# Patient Record
Sex: MALE | Race: WHITE | NOT HISPANIC OR LATINO | URBAN - METROPOLITAN AREA
[De-identification: names, ages, dates, MRNs, and addresses within clinical notes are randomized per-mention and may not be internally consistent; named-entity substitution may affect disease eponyms.]

---

## 2020-12-03 ENCOUNTER — INPATIENT (INPATIENT)
Facility: HOSPITAL | Age: 85
LOS: 2 days | Discharge: HOME HEALTH | End: 2020-12-06
Attending: INTERNAL MEDICINE | Admitting: INTERNAL MEDICINE
Payer: MEDICARE

## 2020-12-03 VITALS
DIASTOLIC BLOOD PRESSURE: 62 MMHG | TEMPERATURE: 99 F | RESPIRATION RATE: 17 BRPM | HEART RATE: 118 BPM | OXYGEN SATURATION: 99 % | SYSTOLIC BLOOD PRESSURE: 101 MMHG

## 2020-12-03 LAB
ALBUMIN SERPL ELPH-MCNC: 3.5 G/DL — SIGNIFICANT CHANGE UP (ref 3.3–5)
ALP SERPL-CCNC: 54 U/L — SIGNIFICANT CHANGE UP (ref 40–120)
ALT FLD-CCNC: 11 U/L — LOW (ref 12–78)
ANION GAP SERPL CALC-SCNC: 7 MMOL/L — SIGNIFICANT CHANGE UP (ref 5–17)
APPEARANCE UR: CLEAR — SIGNIFICANT CHANGE UP
APTT BLD: 28.7 SEC — SIGNIFICANT CHANGE UP (ref 27.5–35.5)
AST SERPL-CCNC: 18 U/L — SIGNIFICANT CHANGE UP (ref 15–37)
BACTERIA # UR AUTO: ABNORMAL
BASOPHILS # BLD AUTO: 0.03 K/UL — SIGNIFICANT CHANGE UP (ref 0–0.2)
BASOPHILS NFR BLD AUTO: 0.4 % — SIGNIFICANT CHANGE UP (ref 0–2)
BILIRUB SERPL-MCNC: 0.9 MG/DL — SIGNIFICANT CHANGE UP (ref 0.2–1.2)
BILIRUB UR-MCNC: NEGATIVE — SIGNIFICANT CHANGE UP
BUN SERPL-MCNC: 17 MG/DL — SIGNIFICANT CHANGE UP (ref 7–23)
CALCIUM SERPL-MCNC: 8.6 MG/DL — SIGNIFICANT CHANGE UP (ref 8.5–10.1)
CHLORIDE SERPL-SCNC: 104 MMOL/L — SIGNIFICANT CHANGE UP (ref 96–108)
CO2 SERPL-SCNC: 28 MMOL/L — SIGNIFICANT CHANGE UP (ref 22–31)
COLOR SPEC: YELLOW — SIGNIFICANT CHANGE UP
CREAT SERPL-MCNC: 1.02 MG/DL — SIGNIFICANT CHANGE UP (ref 0.5–1.3)
DIFF PNL FLD: ABNORMAL
EOSINOPHIL # BLD AUTO: 0.02 K/UL — SIGNIFICANT CHANGE UP (ref 0–0.5)
EOSINOPHIL NFR BLD AUTO: 0.3 % — SIGNIFICANT CHANGE UP (ref 0–6)
EPI CELLS # UR: SIGNIFICANT CHANGE UP
GLUCOSE BLDC GLUCOMTR-MCNC: 111 MG/DL — HIGH (ref 70–99)
GLUCOSE BLDC GLUCOMTR-MCNC: 223 MG/DL — HIGH (ref 70–99)
GLUCOSE SERPL-MCNC: 205 MG/DL — HIGH (ref 70–99)
GLUCOSE UR QL: NEGATIVE MG/DL — SIGNIFICANT CHANGE UP
HCT VFR BLD CALC: 35.3 % — LOW (ref 39–50)
HGB BLD-MCNC: 11.4 G/DL — LOW (ref 13–17)
HYALINE CASTS # UR AUTO: ABNORMAL /LPF
IMM GRANULOCYTES NFR BLD AUTO: 0.3 % — SIGNIFICANT CHANGE UP (ref 0–1.5)
INR BLD: 1.17 RATIO — HIGH (ref 0.88–1.16)
KETONES UR-MCNC: ABNORMAL
LACTATE SERPL-SCNC: 1.1 MMOL/L — SIGNIFICANT CHANGE UP (ref 0.7–2)
LEUKOCYTE ESTERASE UR-ACNC: NEGATIVE — SIGNIFICANT CHANGE UP
LYMPHOCYTES # BLD AUTO: 0.73 K/UL — LOW (ref 1–3.3)
LYMPHOCYTES # BLD AUTO: 10.2 % — LOW (ref 13–44)
MCHC RBC-ENTMCNC: 29.3 PG — SIGNIFICANT CHANGE UP (ref 27–34)
MCHC RBC-ENTMCNC: 32.3 GM/DL — SIGNIFICANT CHANGE UP (ref 32–36)
MCV RBC AUTO: 90.7 FL — SIGNIFICANT CHANGE UP (ref 80–100)
MONOCYTES # BLD AUTO: 0.38 K/UL — SIGNIFICANT CHANGE UP (ref 0–0.9)
MONOCYTES NFR BLD AUTO: 5.3 % — SIGNIFICANT CHANGE UP (ref 2–14)
NEUTROPHILS # BLD AUTO: 6.01 K/UL — SIGNIFICANT CHANGE UP (ref 1.8–7.4)
NEUTROPHILS NFR BLD AUTO: 83.5 % — HIGH (ref 43–77)
NITRITE UR-MCNC: NEGATIVE — SIGNIFICANT CHANGE UP
NRBC # BLD: 0 /100 WBCS — SIGNIFICANT CHANGE UP (ref 0–0)
PH UR: 6.5 — SIGNIFICANT CHANGE UP (ref 5–8)
PLATELET # BLD AUTO: 236 K/UL — SIGNIFICANT CHANGE UP (ref 150–400)
POTASSIUM SERPL-MCNC: 4.3 MMOL/L — SIGNIFICANT CHANGE UP (ref 3.5–5.3)
POTASSIUM SERPL-SCNC: 4.3 MMOL/L — SIGNIFICANT CHANGE UP (ref 3.5–5.3)
PROT SERPL-MCNC: 7.2 GM/DL — SIGNIFICANT CHANGE UP (ref 6–8.3)
PROT UR-MCNC: 30 MG/DL
PROTHROM AB SERPL-ACNC: 13.5 SEC — SIGNIFICANT CHANGE UP (ref 10.6–13.6)
RBC # BLD: 3.89 M/UL — LOW (ref 4.2–5.8)
RBC # FLD: 13.9 % — SIGNIFICANT CHANGE UP (ref 10.3–14.5)
RBC CASTS # UR COMP ASSIST: SIGNIFICANT CHANGE UP /HPF (ref 0–4)
SARS-COV-2 RNA SPEC QL NAA+PROBE: SIGNIFICANT CHANGE UP
SODIUM SERPL-SCNC: 139 MMOL/L — SIGNIFICANT CHANGE UP (ref 135–145)
SP GR SPEC: 1.01 — SIGNIFICANT CHANGE UP (ref 1.01–1.02)
UROBILINOGEN FLD QL: 1 MG/DL
WBC # BLD: 7.19 K/UL — SIGNIFICANT CHANGE UP (ref 3.8–10.5)
WBC # FLD AUTO: 7.19 K/UL — SIGNIFICANT CHANGE UP (ref 3.8–10.5)
WBC UR QL: SIGNIFICANT CHANGE UP

## 2020-12-03 PROCEDURE — 99285 EMERGENCY DEPT VISIT HI MDM: CPT | Mod: CS

## 2020-12-03 PROCEDURE — 71045 X-RAY EXAM CHEST 1 VIEW: CPT | Mod: 26

## 2020-12-03 PROCEDURE — 99223 1ST HOSP IP/OBS HIGH 75: CPT

## 2020-12-03 PROCEDURE — 70450 CT HEAD/BRAIN W/O DYE: CPT | Mod: 26,MC

## 2020-12-03 PROCEDURE — 93010 ELECTROCARDIOGRAM REPORT: CPT

## 2020-12-03 RX ORDER — CITALOPRAM 10 MG/1
1 TABLET, FILM COATED ORAL
Qty: 0 | Refills: 0 | DISCHARGE

## 2020-12-03 RX ORDER — INSULIN LISPRO 100/ML
VIAL (ML) SUBCUTANEOUS AT BEDTIME
Refills: 0 | Status: DISCONTINUED | OUTPATIENT
Start: 2020-12-03 | End: 2020-12-06

## 2020-12-03 RX ORDER — TAMSULOSIN HYDROCHLORIDE 0.4 MG/1
1 CAPSULE ORAL
Qty: 0 | Refills: 0 | DISCHARGE

## 2020-12-03 RX ORDER — AMLODIPINE BESYLATE 2.5 MG/1
5 TABLET ORAL DAILY
Refills: 0 | Status: DISCONTINUED | OUTPATIENT
Start: 2020-12-03 | End: 2020-12-06

## 2020-12-03 RX ORDER — DEXTROSE 50 % IN WATER 50 %
25 SYRINGE (ML) INTRAVENOUS ONCE
Refills: 0 | Status: DISCONTINUED | OUTPATIENT
Start: 2020-12-03 | End: 2020-12-06

## 2020-12-03 RX ORDER — CELECOXIB 200 MG/1
200 CAPSULE ORAL DAILY
Refills: 0 | Status: DISCONTINUED | OUTPATIENT
Start: 2020-12-03 | End: 2020-12-06

## 2020-12-03 RX ORDER — CEFTRIAXONE 500 MG/1
1000 INJECTION, POWDER, FOR SOLUTION INTRAMUSCULAR; INTRAVENOUS ONCE
Refills: 0 | Status: COMPLETED | OUTPATIENT
Start: 2020-12-03 | End: 2020-12-03

## 2020-12-03 RX ORDER — SODIUM CHLORIDE 9 MG/ML
1000 INJECTION, SOLUTION INTRAVENOUS
Refills: 0 | Status: DISCONTINUED | OUTPATIENT
Start: 2020-12-03 | End: 2020-12-06

## 2020-12-03 RX ORDER — GLUCAGON INJECTION, SOLUTION 0.5 MG/.1ML
1 INJECTION, SOLUTION SUBCUTANEOUS ONCE
Refills: 0 | Status: DISCONTINUED | OUTPATIENT
Start: 2020-12-03 | End: 2020-12-06

## 2020-12-03 RX ORDER — GABAPENTIN 400 MG/1
1 CAPSULE ORAL
Qty: 0 | Refills: 0 | DISCHARGE

## 2020-12-03 RX ORDER — METFORMIN HYDROCHLORIDE 850 MG/1
1 TABLET ORAL
Qty: 0 | Refills: 0 | DISCHARGE

## 2020-12-03 RX ORDER — LOSARTAN POTASSIUM 100 MG/1
50 TABLET, FILM COATED ORAL DAILY
Refills: 0 | Status: DISCONTINUED | OUTPATIENT
Start: 2020-12-03 | End: 2020-12-06

## 2020-12-03 RX ORDER — GABAPENTIN 400 MG/1
300 CAPSULE ORAL DAILY
Refills: 0 | Status: DISCONTINUED | OUTPATIENT
Start: 2020-12-03 | End: 2020-12-06

## 2020-12-03 RX ORDER — CITALOPRAM 10 MG/1
20 TABLET, FILM COATED ORAL DAILY
Refills: 0 | Status: DISCONTINUED | OUTPATIENT
Start: 2020-12-03 | End: 2020-12-06

## 2020-12-03 RX ORDER — TAMSULOSIN HYDROCHLORIDE 0.4 MG/1
0.4 CAPSULE ORAL AT BEDTIME
Refills: 0 | Status: DISCONTINUED | OUTPATIENT
Start: 2020-12-03 | End: 2020-12-06

## 2020-12-03 RX ORDER — DEXTROSE 50 % IN WATER 50 %
15 SYRINGE (ML) INTRAVENOUS ONCE
Refills: 0 | Status: DISCONTINUED | OUTPATIENT
Start: 2020-12-03 | End: 2020-12-06

## 2020-12-03 RX ORDER — CELECOXIB 200 MG/1
1 CAPSULE ORAL
Qty: 0 | Refills: 0 | DISCHARGE

## 2020-12-03 RX ORDER — AMLODIPINE BESYLATE 2.5 MG/1
1 TABLET ORAL
Qty: 0 | Refills: 0 | DISCHARGE

## 2020-12-03 RX ORDER — DEXTROSE 50 % IN WATER 50 %
12.5 SYRINGE (ML) INTRAVENOUS ONCE
Refills: 0 | Status: DISCONTINUED | OUTPATIENT
Start: 2020-12-03 | End: 2020-12-06

## 2020-12-03 RX ORDER — SODIUM CHLORIDE 9 MG/ML
2100 INJECTION INTRAMUSCULAR; INTRAVENOUS; SUBCUTANEOUS ONCE
Refills: 0 | Status: COMPLETED | OUTPATIENT
Start: 2020-12-03 | End: 2020-12-03

## 2020-12-03 RX ORDER — INSULIN LISPRO 100/ML
VIAL (ML) SUBCUTANEOUS
Refills: 0 | Status: DISCONTINUED | OUTPATIENT
Start: 2020-12-03 | End: 2020-12-06

## 2020-12-03 RX ORDER — LOSARTAN POTASSIUM 100 MG/1
1 TABLET, FILM COATED ORAL
Qty: 0 | Refills: 0 | DISCHARGE

## 2020-12-03 RX ADMIN — SODIUM CHLORIDE 2100 MILLILITER(S): 9 INJECTION INTRAMUSCULAR; INTRAVENOUS; SUBCUTANEOUS at 14:13

## 2020-12-03 RX ADMIN — CEFTRIAXONE 100 MILLIGRAM(S): 500 INJECTION, POWDER, FOR SOLUTION INTRAMUSCULAR; INTRAVENOUS at 14:09

## 2020-12-03 RX ADMIN — AMLODIPINE BESYLATE 5 MILLIGRAM(S): 2.5 TABLET ORAL at 20:20

## 2020-12-03 RX ADMIN — TAMSULOSIN HYDROCHLORIDE 0.4 MILLIGRAM(S): 0.4 CAPSULE ORAL at 22:16

## 2020-12-03 NOTE — ED PROVIDER NOTE - PROGRESS NOTE DETAILS
Pt resting comfortably in bed, in NAD. Pt lucid in ED, son at bedside. Pt w/ persistent unsteady ambulation, dizziness. D/t HPI worsening confusion and unsteady gait, will admit. Neuro consulted.

## 2020-12-03 NOTE — H&P ADULT - HISTORY OF PRESENT ILLNESS
89yo M w/ PMH HTN, DM, BPH, arthritis BIBA d/t AMS. Pt coming from home, family noted pt w/ worsening confusion, per report, pt w/ increased urination and complaining of dysuria. Family unable to provide last known normal, but thinks pt more confused x 3-4 days, worse today. Pt tachycardic on ED arrival. Pt states he is feeling well, endorses dizziness - like he was going to fall down / dehydrated - this AM. Denies F/C, CP, SOB, cough, abd pain, N/V/D, dysuria, hematuria.

## 2020-12-03 NOTE — H&P ADULT - NSICDXPASTMEDICALHX_GEN_ALL_CORE_FT
PAST MEDICAL HISTORY:  BPH (benign prostatic hyperplasia)     DM (diabetes mellitus)     HTN (hypertension)

## 2020-12-03 NOTE — ED PROVIDER NOTE - CLINICAL SUMMARY MEDICAL DECISION MAKING FREE TEXT BOX
89yo M w/ PMH HTN, DM, arthritis, prostate BIBA d/t AMS x 3-4 days. AFVSS. Pt well appearing, in NAD, unremarkable physical exam. Plan: Obtain CBC, CMP, PT/PTT/INR, ECG, trop, CXR, UA/C, CT brain. Give IVF. Re-eval. CBC, CMP w/o significant abnormalities, ECG NSR, trop neg, CXR and UA w/o evidence of infection. CT brain w/o acute pathology, chronic ischemic changes. On re-eval, pt lucid but w/ persistent dizziness, unsteady gait. 89yo M w/ PMH HTN, DM, arthritis, prostate BIBA d/t AMS x 3-4 days. AFVSS. Pt well appearing, in NAD, unremarkable physical exam. Plan: Obtain CBC, CMP, PT/PTT/INR, ECG, trop, CXR, UA/C, lactate, COVID, CT brain. Give IVF. Re-eval. Lactate WNL. CBC, CMP w/o significant abnormalities, ECG NSR, trop neg, CXR and UA w/o evidence of infection. COVID neg.  CT brain w/o acute pathology, chronic ischemic changes. On re-eval, pt lucid but w/ persistent dizziness, unsteady gait. D/t HPI confusion and unsteady gait, will admit pt to medicine (D/w Dr Griffin), neuro (Dr Vasquez) consulted. Pt and son understand and agree w/ this plan.

## 2020-12-03 NOTE — H&P ADULT - NSHPREVIEWOFSYSTEMS_GEN_ALL_CORE
CONSTITUTIONAL: No fever, weight loss, or fatigue  EYES: No eye pain, visual disturbances, or discharge  ENMT:  No difficulty hearing, tinnitus, vertigo; No sinus or throat pain  NECK: No pain or stiffness  RESPIRATORY: No cough, wheezing, chills or hemoptysis; No shortness of breath  CARDIOVASCULAR: No chest pain, palpitations, dizziness, or leg swelling  GASTROINTESTINAL: No abdominal or epigastric pain. No nausea, vomiting, or hematemesis; No diarrhea or constipation. No melena or hematochezia.  GENITOURINARY: see history of present illness   NEUROLOGICAL: No headaches, memory loss, loss of strength, numbness, or tremors  SKIN: No itching, burning, rashes, or lesions   LYMPH NODES: No enlarged glands  ENDOCRINE: No heat or cold intolerance; No hair loss  MUSCULOSKELETAL: No joint pain or swelling; No muscle, back, or extremity pain  PSYCHIATRIC: No depression, anxiety, mood swings, or difficulty sleeping  HEME/LYMPH: No easy bruising, or bleeding gums  ALLERGY AND IMMUNOLOGIC: No hives or eczema

## 2020-12-03 NOTE — H&P ADULT - ASSESSMENT
89 years old male with increased confusion according to child    IMPROVE VTE Individual Risk Assessment          RISK                                                          Points  [  ] Previous VTE                                                3  [  ] Thrombophilia                                             2  [  ] Lower limb paralysis                                   2        (unable to hold up >15 seconds)    [  ] Current Cancer                                             2         (within 6 months)  [  x] Immobilization > 24 hrs                              1  [  ] ICU/CCU stay > 24 hours                             1  [x  ] Age > 60                                                         1    IMPROVE VTE Score: 2

## 2020-12-03 NOTE — ED PROVIDER NOTE - OBJECTIVE STATEMENT
89yo M BIBA d/t AMS. Pt coming from home, family noted pt w/ worsening confusion, per report, pt w/ increased urination and complaining of dysuria. Family unable to provide last known normal. Pt tachycardic on ED arrival. Pt states he is feeling well, endorses dizziness - like he was going to fall down / dehydrated - this AM. Denies F/C, CP, SOB, cough, abd pain, N/V/D, dysuria, hematuria.     PMH as above, PSH none, NKDA, meds as listed. 89yo M w/ PMH HTN, DM, BPH, arthritis BIBA d/t AMS. Pt coming from home, family noted pt w/ worsening confusion, per report, pt w/ increased urination and complaining of dysuria. Family unable to provide last known normal, but thinks pt more confused x 3-4 days, worse today. Pt tachycardic on ED arrival. Pt states he is feeling well, endorses dizziness - like he was going to fall down / dehydrated - this AM. Denies F/C, CP, SOB, cough, abd pain, N/V/D, dysuria, hematuria.     PMH as above, PSH appy, NKDA, meds as listed. Pt son at bedside.

## 2020-12-03 NOTE — H&P ADULT - NSHPPHYSICALEXAM_GEN_ALL_CORE
ICU Vital Signs Last 24 Hrs  T(C): 36.8 (03 Dec 2020 16:33), Max: 37.2 (03 Dec 2020 13:13)  T(F): 98.2 (03 Dec 2020 16:33), Max: 99 (03 Dec 2020 13:13)  HR: 59 (03 Dec 2020 16:33) (59 - 118)  BP: 149/64 (03 Dec 2020 16:33) (101/62 - 149/64)  BP(mean): --  ABP: --  ABP(mean): --  RR: 18 (03 Dec 2020 16:33) (17 - 18)  SpO2: 98% (03 Dec 2020 16:33) (98% - 99%)  GENERAL: NAD well-developed  HEAD:  Atraumatic, Normocephalic  EYES: EOMI, PERRLA, conjunctiva and sclera clear  ENMT: No tonsillar erythema, exudates, or enlargement; Moist mucous membranes, Good dentition, No lesions  NECK: Supple, No JVD, Normal thyroid  NERVOUS SYSTEM:  Alert & Oriented X3, Good concentration; Motor Strength 5/5 B/L upper and lower extremities; DTRs 2+ intact and symmetric  CHEST/LUNG: Clear to percussion bilaterally; No rales, rhonchi, wheezing, or rubs  HEART: Regular rate and rhythm; No murmurs, rubs, or gallops  ABDOMEN: Soft, Nontender, Nondistended; Bowel sounds present  EXTREMITIES:  2+ Peripheral Pulses, No clubbing, cyanosis, or edema  LYMPH: No lymphadenopathy   SKIN: No rashes or lesions

## 2020-12-03 NOTE — ED ADULT NURSE NOTE - OBJECTIVE STATEMENT
as per ems family noted pt more confused to day than normal. basleine a&O x3, unknown last normal. also complaining of increased urinary frequency and dysuria. equal strength bilateral. no facial droop noted. history of dm and htn. pt had unwitnessed fall, no open wounds deformity. pt deneis pain at this time. no neuro deficits. pain to left shoulder x 2 months. pain with elevation of arm. walks with cane walker at baseline. history HTN DM as per ems family noted pt more confused to day than normal. 0pt a&O to self, unaware of date,.  basleine a&O x3, unknown last normal. also complaining of increased urinary frequency and dysuria. equal strength bilateral. no facial droop noted. history of dm and htn. pt had unwitnessed fall, no open wounds deformity. pt deneis pain at this time. no neuro deficits. pain to left shoulder x 2 months. pain with elevation of arm. walks with cane walker at baseline. history HTN DM

## 2020-12-03 NOTE — ED ADULT NURSE NOTE - NSIMPLEMENTINTERV_GEN_ALL_ED
Implemented All Fall with Harm Risk Interventions:  [place] to call system. Call bell, personal items and telephone within reach. Instruct patient to call for assistance. Room bathroom lighting operational. Non-slip footwear when patient is off stretcher. Physically safe environment: no spills, clutter or unnecessary equipment. Stretcher in lowest position, wheels locked, appropriate side rails in place. Provide visual cue, wrist band, yellow gown, etc. Monitor gait and stability. Monitor for mental status changes and reorient to person, place, and time. Review medications for side effects contributing to fall risk. Reinforce activity limits and safety measures with patient and family. Provide visual clues: red socks.

## 2020-12-03 NOTE — ED ADULT TRIAGE NOTE - CHIEF COMPLAINT QUOTE
as per ems family noted pt more confused to day than normal. unknown last normal. also complaining of increased urinary frequency and dysruia. equal strength bilateral. no facial droop noted. hisory of dm and htn.

## 2020-12-03 NOTE — CONSULT NOTE ADULT - ASSESSMENT
Subjective Complaints:      Consult requested by ER doctor:                  Attending:     History of Present Illness:  Chief Complaint/Reason for Admission:  History of Present Illness:  HPI:  89yo M w/ PMH HTN, DM, BPH, arthritis BIBA d/t AMS. Pt coming from home, family noted pt w/ worsening confusion, per report, pt w/ increased urination and complaining of dysuria. Family unable to provide last known normal, but thinks pt more confused x 3-4 days, worse today. Pt tachycardic on ED arrival. Pt states he is feeling well, endorses dizziness - like he was going to fall down / dehydrated - this AM. Denies F/C, CP, SOB, cough, abd pain, N/V/D, dysuria, hematuria.    (03 Dec 2020 18:06)        PAST MEDICAL & SURGICAL HISTORY:  BPH (benign prostatic hyperplasia)    HTN (hypertension)    DM (diabetes mellitus)    89yMale    MEDICATIONS  (STANDING):  amLODIPine   Tablet 5 milliGRAM(s) Oral daily  celecoxib 200 milliGRAM(s) Oral daily  citalopram 20 milliGRAM(s) Oral daily  dextrose 40% Gel 15 Gram(s) Oral once  dextrose 5%. 1000 milliLiter(s) (50 mL/Hr) IV Continuous <Continuous>  dextrose 5%. 1000 milliLiter(s) (100 mL/Hr) IV Continuous <Continuous>  dextrose 50% Injectable 25 Gram(s) IV Push once  dextrose 50% Injectable 12.5 Gram(s) IV Push once  dextrose 50% Injectable 25 Gram(s) IV Push once  gabapentin 300 milliGRAM(s) Oral daily  glucagon  Injectable 1 milliGRAM(s) IntraMuscular once  insulin lispro (ADMELOG) corrective regimen sliding scale   SubCutaneous three times a day before meals  insulin lispro (ADMELOG) corrective regimen sliding scale   SubCutaneous at bedtime  losartan 50 milliGRAM(s) Oral daily  tamsulosin 0.4 milliGRAM(s) Oral at bedtime    MEDICATIONS  (PRN):      Allergies    No Known Allergies    Intolerances      FAMILY HISTORY:      REVIEW OF SYSTEMS:  General:  No wt loss, fevers, chills, night sweats  Eyes:  Good vision, no reported pain  ENT:  No sore throat, pain, runny nose, dysphagia  CV:  No pain, palpitatioins, hypo/hypertension  Resp:  No dyspnea, cough, tachypnea, wheezing  GI:  No pain, nausea, vomiting, diarrhea, constipatiion  :  No pain, bleeding, incontinence, nocturia  Muscle:  No pain, weakness  Breast:  No pain, abscess, mass, discharge  Neuro:  No weakness, tingling, memory problems  Psych:  No fatigue, insomnia, mood problems, depression  Endocrine:  No polyuria, polydypsia, cold/heat intolerance  Heme:  No petechiae, ecchymosis, easy bruisability  Skin:  No rash, tattoos, scars, edema      Vital Signs Last 24 Hrs  T(C): 36.8 (03 Dec 2020 20:51), Max: 37.2 (03 Dec 2020 13:13)  T(F): 98.3 (03 Dec 2020 20:51), Max: 99 (03 Dec 2020 13:13)  HR: 61 (03 Dec 2020 20:51) (59 - 118)  BP: 164/82 (03 Dec 2020 20:51) (101/62 - 174/84)  BP(mean): --  RR: 18 (03 Dec 2020 20:51) (17 - 19)  SpO2: 97% (03 Dec 2020 20:51) (97% - 99%)    GENERAL PHYSICAL EXAM:  General:  Appears stated age, well-groomed, well-nourished, no distress  HEENT:  NC/AT, patent nares w/ pink mucosa, OP clear w/o lesions, PERRL, EOMI, conjunctivae clear, no thyromegaly, nodules, adenopathy, no JVD  Chest:  Full & symmetric excursion, no increased effort, breath sounds clear  Cardiovascular:  Regular rhythm, S1, S2, no murmur/rub/S3/S4, no carotid/femoral/abdominal bruit, radial/pedal pulses 2+, no edema  Abdomen:  Soft, non-tender, non-distended, normoactive bowel sounds, no HSM  Extremities:  Gait & station:   Digits:   Nails:   Joints, Bones, Muscles:   ROM:   Stability:  Skin:  No rash/erythema/ecchymoses/petechiae/wounds/abscess/warm/dry  Musculoskeletal:  Full ROM in all joints w/o swelling/tenderness/effusion    NEUROLOGICAL EXAM:  HENT:  Normocephalic head; atraumatic head.  Neck supple.  ENT: normal looking.  Mental State:    Alert.   awaek open eyes follows commands poor memory  poor hiostorian .  Speech clear and intact.  Cooperative.  Responds appropriately.    Cranial Nerves:  II-XII:   Pupils round and reactive to light and accommodation.  Extraocular movements full.  Visual fields full (no homonymous hemianopsia).  Visual acuity wnl.  Facial symmetry intact.  Tongue midline.  Motor Functions:  Moves all extremities.  No pronator drift of UE.  Claps hand well.  Hand  intact bilaterally. walk with walker .    Sensory Functions:   Intact to touch and pinprick to face and extremities.    Reflexes:  Deep tendon reflexes normoactive to biceps, knees and ankles.  Babinski absent .  Cerebellar Testing:    Finger to nose intact.  Nystagmus absent.  Neurovascular: Carotid auscultation full without bruits.      LABS:                        11.4   7.19  )-----------( 236      ( 03 Dec 2020 14:05 )             35.3     12-03    139  |  104  |  17  ----------------------------<  205<H>  4.3   |  28  |  1.02    Ca    8.6      03 Dec 2020 14:05    TPro  7.2  /  Alb  3.5  /  TBili  0.9  /  DBili  x   /  AST  18  /  ALT  11<L>  /  AlkPhos  54  12-03    PT/INR - ( 03 Dec 2020 14:05 )   PT: 13.5 sec;   INR: 1.17 ratio         PTT - ( 03 Dec 2020 14:05 )  PTT:28.7 sec    Urinalysis Basic - ( 03 Dec 2020 16:24 )    Color: Yellow / Appearance: Clear / S.015 / pH: x  Gluc: x / Ketone: Small  / Bili: Negative / Urobili: 1 mg/dL   Blood: x / Protein: 30 mg/dL / Nitrite: Negative   Leuk Esterase: Negative / RBC: 0-2 /HPF / WBC 0-2   Sq Epi: x / Non Sq Epi: Occasional / Bacteria: Few        RADIOLOGY & ADDITIONAL STUDIES:      Assessment & Opinion: events   noted hx of htn diabates  unsteady gait confused  poor memeory dizziness ct head  no acute path poor historian  folows  commands arm leg 4/5 sensory intact for carorrr=tid doppler echo eeg tsh b12 pr rehab  will follow     Recommendations:  Brain MRI.  Carotid doppler.  Echocardiogram.  EEG.   DVT prophylaxis as ordered. tsh b12  pt for rehab sub acute   Medications:

## 2020-12-03 NOTE — ED ADULT NURSE REASSESSMENT NOTE - NS ED NURSE REASSESS COMMENT FT1
as per patient logistic awaiting second order for bed assignment, md cabello told and made aware at bedside.  2nd attempt md cabello paged for second orders [id number].

## 2020-12-04 LAB
A1C WITH ESTIMATED AVERAGE GLUCOSE RESULT: 6.2 % — HIGH (ref 4–5.6)
CULTURE RESULTS: SIGNIFICANT CHANGE UP
ESTIMATED AVERAGE GLUCOSE: 131 MG/DL — HIGH (ref 68–114)
GLUCOSE BLDC GLUCOMTR-MCNC: 114 MG/DL — HIGH (ref 70–99)
GLUCOSE BLDC GLUCOMTR-MCNC: 132 MG/DL — HIGH (ref 70–99)
GLUCOSE BLDC GLUCOMTR-MCNC: 141 MG/DL — HIGH (ref 70–99)
GLUCOSE BLDC GLUCOMTR-MCNC: 176 MG/DL — HIGH (ref 70–99)
SPECIMEN SOURCE: SIGNIFICANT CHANGE UP
TSH SERPL-MCNC: 0.98 UIU/ML — SIGNIFICANT CHANGE UP (ref 0.36–3.74)
VIT B12 SERPL-MCNC: 1459 PG/ML — HIGH (ref 232–1245)

## 2020-12-04 PROCEDURE — 70551 MRI BRAIN STEM W/O DYE: CPT | Mod: 26

## 2020-12-04 PROCEDURE — 99232 SBSQ HOSP IP/OBS MODERATE 35: CPT

## 2020-12-04 RX ADMIN — CELECOXIB 200 MILLIGRAM(S): 200 CAPSULE ORAL at 12:02

## 2020-12-04 RX ADMIN — TAMSULOSIN HYDROCHLORIDE 0.4 MILLIGRAM(S): 0.4 CAPSULE ORAL at 21:25

## 2020-12-04 RX ADMIN — AMLODIPINE BESYLATE 5 MILLIGRAM(S): 2.5 TABLET ORAL at 05:28

## 2020-12-04 RX ADMIN — Medication 1: at 16:35

## 2020-12-04 RX ADMIN — Medication 0: at 21:25

## 2020-12-04 RX ADMIN — GABAPENTIN 300 MILLIGRAM(S): 400 CAPSULE ORAL at 12:02

## 2020-12-04 RX ADMIN — CITALOPRAM 20 MILLIGRAM(S): 10 TABLET, FILM COATED ORAL at 12:02

## 2020-12-04 RX ADMIN — LOSARTAN POTASSIUM 50 MILLIGRAM(S): 100 TABLET, FILM COATED ORAL at 05:28

## 2020-12-04 NOTE — OCCUPATIONAL THERAPY INITIAL EVALUATION ADULT - GENERAL OBSERVATIONS, REHAB EVAL
Pt was seen for initial OT consult, encountered sitting at the edge of bed in NAD. Pt was AA&Ox2, confused and tangential, cooperative & followed 1 step commands. Pt moves all extremities and incooperate both sides of his body. Pt denied pain dizziness or numbness. Pt presents with decreased endurance , balance, ROM, muscle strength and ADL management.

## 2020-12-04 NOTE — OCCUPATIONAL THERAPY INITIAL EVALUATION ADULT - TRANSFER TRAINING, PT EVAL
Pt will transfer to all surfaces, safely and independently with least restrictive adaptive device within 2 weeks.

## 2020-12-04 NOTE — PHYSICAL THERAPY INITIAL EVALUATION ADULT - STRENGTHENING, PT EVAL
Pt will improve muscle strength in all extremities to WFL in 1 to 2 weeks to perform Gait & ADL with sup.

## 2020-12-04 NOTE — OCCUPATIONAL THERAPY INITIAL EVALUATION ADULT - ADDITIONAL COMMENTS
Prior to admission, pt was functioning in her roles, self sufficient & ambulating independently without any assistive devices. Presently pt needs assistance with some lower body self care tasks due to knee pain, weakness, , decreased ROM, endurance and balance. Pt is right hand dominant and wears glasses for reading.

## 2020-12-04 NOTE — OCCUPATIONAL THERAPY INITIAL EVALUATION ADULT - RANGE OF MOTION, PT EVAL
Pt will increase ROM in left shoulder  by 30 degrees to safely and independently perform upper body self care tasks within 30 days .

## 2020-12-04 NOTE — PHYSICAL THERAPY INITIAL EVALUATION ADULT - CRITERIA FOR SKILLED THERAPEUTIC INTERVENTIONS
risk reduction/prevention/therapy frequency/predicted duration of therapy intervention/impairments found/functional limitations in following categories/anticipated discharge recommendation/rehab potential/anticipated equipment needs at discharge/Home with ome PT, with 24 hour supervision

## 2020-12-04 NOTE — OCCUPATIONAL THERAPY INITIAL EVALUATION ADULT - PERTINENT HX OF CURRENT PROBLEM, REHAB EVAL
Pt is a 89 y/o male presented to ER and was diagnosed with dizziness. AS per chart pt's family noted worsening confusion,3-4 days, but more pronounced on the day of admission. Pt was tachycardic on ED upon arrival with  increased urination and complaint of  dysuria. . MRI on 12/4/2020 results confirm there is no diffusion restriction to suggest acute infarct

## 2020-12-04 NOTE — PHYSICAL THERAPY INITIAL EVALUATION ADULT - IMPAIRED TRANSFERS: SIT/STAND, REHAB EVAL
Patient Education   Please call central scheduling [phone] to schedule your CT scan. This can be done in [facility], [place], [facility]. We will call you with results.   Computed Tomography (CT)     During the test, relax and remain as still as you can.     Computed tomography (CT) is a test that combines X-rays and computer scans. The result is a detailed picture that can show problems with soft tissues, such as the lining of your sinuses, organs, such as your kidneys or lungs, blood vessels, and bones.  Tell the technologist   Be sure to tell the technologist if you:  · Have allergies or kidney problems  · Take diabetes medicine  · Are pregnant or think you may be  · Ate or drank anything before the test   Before your test  · Be sure to tell your healthcare provider if you have ever had a reaction to contrast material (\"X-ray dye\"). If you have had a reaction, you may need to take medicine before your scan, so be sure to tell your provider ahead of time.   · Be sure to mention the medicines you take. Ask if it's OK to take them before the test.   · Follow any directions you’re given for not eating or drinking before the procedure. Your provider will give you instructions if required. You may be required to drink contrast by mouth before arriving for the study depending on the type of exam you are having. Your provider or the imaging site will provide this for you.  · The length of the procedure may vary, depending on your condition and your provider's practices.  · Arrive on time to check in.  · When you arrive, you may be asked to change into a hospital gown. Remove all metal near the part of your body that will be scanned, including jewelry, eyeglasses, and dentures. Women may need to remove any bra that has metal underwire.   During your test  · You may be given contrast through an intravenous (IV) line or by mouth.  · You will lie on a table. The table slides into the CT  scanner.  · The technologist will ask you to hold your breath for a few seconds during your scan.  After your test  · You can go back to your normal diet and activities right away. Any contrast will pass naturally through your body within a day.  · Before leaving, you may need to wait briefly while your images are being reviewed. Your healthcare provider will discuss the test results with you during a follow-up appointment or over the phone.  · Your next appointment is:__________________  Date Last Reviewed: 5/1/2017  © 6274-7412 [employer]. [address] [place] [address], [place] [address]. All rights reserved. This information is not intended as a substitute for professional medical care. Always follow your healthcare professional's instructions.            decreased strength/impaired balance/cognition/pain

## 2020-12-04 NOTE — OCCUPATIONAL THERAPY INITIAL EVALUATION ADULT - ANTICIPATED DISCHARGE DISPOSITION, OT EVAL
Home with 24 hr supervision, OT referral to prevent falls, improve balance, muscle strength, endurance in order for the pt to perform ADL management and functional mobility in a safe manner.

## 2020-12-04 NOTE — OCCUPATIONAL THERAPY INITIAL EVALUATION ADULT - RANGE OF MOTION EXAMINATION, UPPER EXTREMITY
ROM is left shoulder 0-70 degrees/Left UE Passive ROM was WFL  (within functional limits)/Right UE Passive ROM was WFL  (within functional limits)/Right UE Active ROM was WFL (within functional limits)/Left UE Active Assistive ROM was WFL  (within functional limits)

## 2020-12-04 NOTE — PHYSICAL THERAPY INITIAL EVALUATION ADULT - DID THE PATIENT HAVE SURGERY?
n/a/Primary admitting diagnosis was Increased confusion, increased urination, dysuria.. CT scan : 12/3/20: 1)  extensive chronic ischemic changes in both hemispheres with volume loss and involutional change. No acute infarct or hemorrhagic lesion suggested. No hydrocephalus or collections. MRI:12/4/20: There is no diffusion restriction to suggest acute infarct. There is no gross mass effect or hydrocephalus. The orbital and sellar contents and cerebellar tonsils are within normal limits

## 2020-12-04 NOTE — OCCUPATIONAL THERAPY INITIAL EVALUATION ADULT - LIVES WITH, PROFILE
in a private house 2 steps to enter from the front and 1 step from the side with handrail. Once inside, pt has to negotiate a flight of stairs  with hand rail, to access the bedroom and bathroom. Pt can stay on the main floor, where there is an addition bed room and bathroom. The bathroom has a tub/shower combination , fixed shower head and standard toilet./spouse

## 2020-12-04 NOTE — PHYSICAL THERAPY INITIAL EVALUATION ADULT - ADDITIONAL COMMENTS
As per OT conversation with pt son, he lives with his spouse, son in a house with back entrance with 1 step to enter, there are steps to go to 2nd floor, however pt is going to stay in the main level. HE was independent in all functional mobility without any AD, however sometimes needs limited assist with directions, secondary to impaired cognition.

## 2020-12-04 NOTE — OCCUPATIONAL THERAPY INITIAL EVALUATION ADULT - PLANNED THERAPY INTERVENTIONS, OT EVAL
ADL retraining/bed mobility training/fine motor coordination training/ROM/strengthening/energy conservation techniques/cognitive, visual perceptual/transfer training/neuromuscular re-education/parent/caregiver training.../balance training/IADL retraining

## 2020-12-04 NOTE — OCCUPATIONAL THERAPY INITIAL EVALUATION ADULT - SOCIAL CONCERNS
Complex psychosocial needs/coping issues/Pt's son Eduard  voiced concerns about pt's recovery at home. Eduard endorsed that pt's spouse will be able to assist him after discharge home

## 2020-12-04 NOTE — PHYSICAL THERAPY INITIAL EVALUATION ADULT - BALANCE TRAINING, PT EVAL
Pt will improve static & dynamic standing balance to Good using  no AD  to perform ADL, Gait with sup in 1 week

## 2020-12-04 NOTE — PHYSICAL THERAPY INITIAL EVALUATION ADULT - PLANNED THERAPY INTERVENTIONS, PT EVAL
gait training/strengthening/Pt will be able to negotiate 1 back entry step with sup in 2 to 3 days/transfer training/balance training/bed mobility training

## 2020-12-05 LAB
GLUCOSE BLDC GLUCOMTR-MCNC: 116 MG/DL — HIGH (ref 70–99)
GLUCOSE BLDC GLUCOMTR-MCNC: 126 MG/DL — HIGH (ref 70–99)
GLUCOSE BLDC GLUCOMTR-MCNC: 135 MG/DL — HIGH (ref 70–99)
GLUCOSE BLDC GLUCOMTR-MCNC: 240 MG/DL — HIGH (ref 70–99)
SARS-COV-2 IGG SERPL QL IA: NEGATIVE — SIGNIFICANT CHANGE UP
SARS-COV-2 IGM SERPL IA-ACNC: <0.1 INDEX — SIGNIFICANT CHANGE UP

## 2020-12-05 PROCEDURE — 99232 SBSQ HOSP IP/OBS MODERATE 35: CPT

## 2020-12-05 RX ORDER — HEPARIN SODIUM 5000 [USP'U]/ML
5000 INJECTION INTRAVENOUS; SUBCUTANEOUS EVERY 12 HOURS
Refills: 0 | Status: DISCONTINUED | OUTPATIENT
Start: 2020-12-05 | End: 2020-12-06

## 2020-12-05 RX ADMIN — TAMSULOSIN HYDROCHLORIDE 0.4 MILLIGRAM(S): 0.4 CAPSULE ORAL at 21:43

## 2020-12-05 RX ADMIN — CELECOXIB 200 MILLIGRAM(S): 200 CAPSULE ORAL at 11:06

## 2020-12-05 RX ADMIN — Medication 2: at 11:01

## 2020-12-05 RX ADMIN — GABAPENTIN 300 MILLIGRAM(S): 400 CAPSULE ORAL at 11:07

## 2020-12-05 RX ADMIN — CELECOXIB 200 MILLIGRAM(S): 200 CAPSULE ORAL at 11:55

## 2020-12-05 RX ADMIN — LOSARTAN POTASSIUM 50 MILLIGRAM(S): 100 TABLET, FILM COATED ORAL at 05:18

## 2020-12-05 RX ADMIN — CITALOPRAM 20 MILLIGRAM(S): 10 TABLET, FILM COATED ORAL at 11:07

## 2020-12-05 RX ADMIN — HEPARIN SODIUM 5000 UNIT(S): 5000 INJECTION INTRAVENOUS; SUBCUTANEOUS at 17:02

## 2020-12-05 RX ADMIN — AMLODIPINE BESYLATE 5 MILLIGRAM(S): 2.5 TABLET ORAL at 05:18

## 2020-12-05 NOTE — PROGRESS NOTE ADULT - ASSESSMENT
Subjective Complaints:  Historian:             Vital Signs Last 24 Hrs  T(C): 36.8 (04 Dec 2020 17:24), Max: 37.2 (03 Dec 2020 20:20)  T(F): 98.2 (04 Dec 2020 17:24), Max: 99 (03 Dec 2020 20:20)  HR: 77 (04 Dec 2020 17:24) (61 - 93)  BP: 137/79 (04 Dec 2020 17:24) (137/79 - 185/89)  BP(mean): --  RR: 18 (04 Dec 2020 17:24) (18 - 20)  SpO2: 97% (04 Dec 2020 17:24) (93% - 99%)    GENERAL PHYSICAL EXAM:  General:  Appears stated age, well-groomed, well-nourished, no distress  HEENT:  NC/AT, patent nares w/ pink mucosa, OP clear w/o lesions, PERRL, EOMI, conjunctivae clear, no thyromegaly, nodules, adenopathy, no JVD  Chest:  Full & symmetric excursion, no increased effort, breath sounds clear  Cardiovascular:  Regular rhythm, S1, S2, no murmur/rub/S3/S4, no carotid/femoral/abdominal bruit, radial/pedal pulses 2+, no edema  Abdomen:  Soft, non-tender, non-distended, normoactive bowel sounds, no HSM  Extremities:  Gait & station:   Digits:   Nails:   Joints, Bones, Muscles:   ROM:   Stability:  Skin:  No rash/erythema/ecchymoses/petechiae/wounds/abscess/warm/dry  Musculoskeletal:  Full ROM in all joints w/o swelling/tenderness/effusion        LABS:                        11.4   7.19  )-----------( 236      ( 03 Dec 2020 14:05 )             35.3     12-03    139  |  104  |  17  ----------------------------<  205<H>  4.3   |  28  |  1.02    Ca    8.6      03 Dec 2020 14:05    TPro  7.2  /  Alb  3.5  /  TBili  0.9  /  DBili  x   /  AST  18  /  ALT  11<L>  /  AlkPhos  54  12-03    PT/INR - ( 03 Dec 2020 14:05 )   PT: 13.5 sec;   INR: 1.17 ratio         PTT - ( 03 Dec 2020 14:05 )  PTT:28.7 sec  Urinalysis Basic - ( 03 Dec 2020 16:24 )    Color: Yellow / Appearance: Clear / S.015 / pH: x  Gluc: x / Ketone: Small  / Bili: Negative / Urobili: 1 mg/dL   Blood: x / Protein: 30 mg/dL / Nitrite: Negative   Leuk Esterase: Negative / RBC: 0-2 /HPF / WBC 0-2   Sq Epi: x / Non Sq Epi: Occasional / Bacteria: Few        RADIOLOGY & ADDITIONAL STUDIES:        Neurology Progress Note:      Mental Status: awaek alert speech fluent  follows commands       Cranial Nerves: 2 1/2 intact      Motor:   arm leg 4/5         Sensory: intacr      Cerebellar: defrd      Gait: unsteady       Assesment/Plan: dementia  htn confused metabolic encpehalaothy  mri tamar no acute path no seizure for eeg         
Subjective Complaints:  Historian:             Vital Signs Last 24 Hrs  T(C): 37.2 (05 Dec 2020 17:51), Max: 37.4 (05 Dec 2020 11:37)  T(F): 98.9 (05 Dec 2020 17:51), Max: 99.3 (05 Dec 2020 11:37)  HR: 67 (05 Dec 2020 17:51) (61 - 67)  BP: 125/84 (05 Dec 2020 17:51) (125/84 - 177/78)  BP(mean): --  RR: 18 (05 Dec 2020 17:51) (17 - 18)  SpO2: 98% (05 Dec 2020 17:51) (98% - 98%)    GENERAL PHYSICAL EXAM:  General:  Appears stated age, well-groomed, well-nourished, no distress  HEENT:  NC/AT, patent nares w/ pink mucosa, OP clear w/o lesions, PERRL, EOMI, conjunctivae clear, no thyromegaly, nodules, adenopathy, no JVD  Chest:  Full & symmetric excursion, no increased effort, breath sounds clear  Cardiovascular:  Regular rhythm, S1, S2, no murmur/rub/S3/S4, no carotid/femoral/abdominal bruit, radial/pedal pulses 2+, no edema  Abdomen:  Soft, non-tender, non-distended, normoactive bowel sounds, no HSM  Extremities:  Gait & station:   Digits:   Nails:   Joints, Bones, Muscles:   ROM:   Stability:  Skin:  No rash/erythema/ecchymoses/petechiae/wounds/abscess/warm/dry  Musculoskeletal:  Full ROM in all joints w/o swelling/tenderness/effusion        LABS:                RADIOLOGY & ADDITIONAL STUDIES:        Neurology Progress Note:      Mental Status: awaek alert speech fluent folows commands       Cranial Nerves: 2 1./2 intact      Motor:   arm leg 4/5         Sensory: intact      Cerebellar: defrd      Gait: unsteady gait       Assesment/Plan: dementia  htn unsteady gait  mri tamar no acute path for pt sub acute rehab no seizure         
89yo M w/ PMH HTN, DM, BPH, arthritis BIBA d/t AMS. Pt coming from home, family noted pt w/ worsening confusion, per report, pt w/ increased urination and complaining of dysuria. Family unable to provide last known normal, but thinks pt more confused x 3-4 days, worse today. Pt tachycardic on ED arrival. Pt states he is feeling well, endorses dizziness - like he was going to fall down / dehydrated - this AM.    A/P  Metabolic encephalopathy : resolving..   - MRI head: no acute findings.  - Neuro eval noted.  - B12 1459, TSH 0.98, A1C 6.2: result noted   - lactic acid: normal  - follow up blood and urine cult.  - follow up EEG     BPH (benign prostatic hyperplasia).    -  continue home meds.      Essential hypertension.    - continue home meds.      Type 2 diabetes mellitus without complication, without long-term current use of insulin.   - A1C: 6.2  - on ISS.    PT eval.  
89yo M w/ PMH HTN, DM, BPH, arthritis BIBA d/t AMS. Pt coming from home, family noted pt w/ worsening confusion, per report, pt w/ increased urination and complaining of dysuria. Family unable to provide last known normal, but thinks pt more confused x 3-4 days, worse today. Pt tachycardic on ED arrival. Pt states he is feeling well, endorses dizziness - like he was going to fall down / dehydrated      A/P  Metabolic encephalopathy : resolving..   Dementia as per neurology.  - MRI head: no acute findings.  - EEG: no Sz as per neurology.  - B12 1459, TSH 0.98, A1C 6.2: result noted   - lactic acid: normal  - blood and urine cult: NGTD.  - continue with supportive care.  - Neurology is following.    Poss dehydration.  - IVF hydration given       BPH (benign prostatic hyperplasia).    -  continue home meds.      Essential hypertension.    - continue home meds.      Type 2 diabetes mellitus without complication, without long-term current use of insulin.   - A1C: 6.2  - on ISS.    PT lisa noted, recommended home with home PT

## 2020-12-05 NOTE — PROGRESS NOTE ADULT - SUBJECTIVE AND OBJECTIVE BOX
Patient is a 89y old  Male who presents with a chief complaint of dementia confused (04 Dec 2020 19:07), has no pain.       MEDICATIONS  (STANDING):  amLODIPine   Tablet 5 milliGRAM(s) Oral daily  celecoxib 200 milliGRAM(s) Oral daily  citalopram 20 milliGRAM(s) Oral daily  dextrose 40% Gel 15 Gram(s) Oral once  dextrose 5%. 1000 milliLiter(s) (50 mL/Hr) IV Continuous <Continuous>  dextrose 5%. 1000 milliLiter(s) (100 mL/Hr) IV Continuous <Continuous>  dextrose 50% Injectable 25 Gram(s) IV Push once  dextrose 50% Injectable 12.5 Gram(s) IV Push once  dextrose 50% Injectable 25 Gram(s) IV Push once  gabapentin 300 milliGRAM(s) Oral daily  glucagon  Injectable 1 milliGRAM(s) IntraMuscular once  insulin lispro (ADMELOG) corrective regimen sliding scale   SubCutaneous three times a day before meals  insulin lispro (ADMELOG) corrective regimen sliding scale   SubCutaneous at bedtime  losartan 50 milliGRAM(s) Oral daily  tamsulosin 0.4 milliGRAM(s) Oral at bedtime    MEDICATIONS  (PRN):    REVIEW OF SYSTEMS: 12 systems reviewed      Vital Signs Last 24 Hrs  T(C): 36.7 (05 Dec 2020 05:08), Max: 36.8 (04 Dec 2020 12:00)  T(F): 98 (05 Dec 2020 05:08), Max: 98.2 (04 Dec 2020 12:00)  HR: 61 (05 Dec 2020 05:08) (61 - 77)  BP: 177/78 (05 Dec 2020 05:08) (137/79 - 177/78)  BP(mean): --  RR: 18 (05 Dec 2020 05:08) (18 - 18)  SpO2: 98% (05 Dec 2020 05:08) (96% - 98%)    PHYSICAL EXAM:  GENERAL: NAD, well-groomed, well-developed  HEAD:  Atraumatic, Normocephalic  EYES: PERRLA, conjunctiva and sclera clear  ENMT: No tonsillar erythema, exudates, or enlargement; Moist mucous membranes   NECK: Supple, No JVD   NERVOUS SYSTEM:  Alert & Oriented to person, place, partially to time, Motor Strength appropriate  CHEST/LUNG: Clear to auscultation  bilaterally; No rales, rhonchi, wheezing, or rubs  HEART: Regular rate and rhythm; No murmurs, rubs, or gallops  ABDOMEN: Soft, Nontender, Nondistended; Bowel sounds present  EXTREMITIES:  2+ Peripheral Pulses, No clubbing, cyanosis, or edema.  LYMPH: No lymphadenopathy noted  SKIN: No rashes or lesions        LABS:                        11.4   7.19  )-----------( 236      ( 03 Dec 2020 14:05 )             35.3     12    139  |  104  |  17  ----------------------------<  205<H>  4.3   |  28  |  1.02    Ca    8.6      03 Dec 2020 14:05    TPro  7.2  /  Alb  3.5  /  TBili  0.9  /  DBili  x   /  AST  18  /  ALT  11<L>  /  AlkPhos  54  1203    PT/INR - ( 03 Dec 2020 14:05 )   PT: 13.5 sec;   INR: 1.17 ratio         PTT - ( 03 Dec 2020 14:05 )  PTT:28.7 sec   cardiac markers   Urinalysis Basic - ( 03 Dec 2020 16:24 )    Color: Yellow / Appearance: Clear / S.015 / pH: x  Gluc: x / Ketone: Small  / Bili: Negative / Urobili: 1 mg/dL   Blood: x / Protein: 30 mg/dL / Nitrite: Negative   Leuk Esterase: Negative / RBC: 0-2 /HPF / WBC 0-2   Sq Epi: x / Non Sq Epi: Occasional / Bacteria: Few      CAPILLARY BLOOD GLUCOSE      POCT Blood Glucose.: 116 mg/dL (05 Dec 2020 07:57)  POCT Blood Glucose.: 114 mg/dL (04 Dec 2020 21:19)  POCT Blood Glucose.: 176 mg/dL (04 Dec 2020 16:01)  POCT Blood Glucose.: 141 mg/dL (04 Dec 2020 11:37)    Cultures    RADIOLOGY & ADDITIONAL TESTS:    Imaging Personally Reviewed:  [x ] YES  [ ] NO    Consultant(s) Notes Reviewed:  [x ] YES  [ ] NO    Care Discussed with Consultants/Other Providers [x ] YES  [ ] NO

## 2020-12-06 VITALS — SYSTOLIC BLOOD PRESSURE: 158 MMHG | HEART RATE: 71 BPM | DIASTOLIC BLOOD PRESSURE: 80 MMHG

## 2020-12-06 LAB
GLUCOSE BLDC GLUCOMTR-MCNC: 149 MG/DL — HIGH (ref 70–99)
GLUCOSE BLDC GLUCOMTR-MCNC: 162 MG/DL — HIGH (ref 70–99)
GLUCOSE BLDC GLUCOMTR-MCNC: 205 MG/DL — HIGH (ref 70–99)

## 2020-12-06 PROCEDURE — 99239 HOSP IP/OBS DSCHRG MGMT >30: CPT

## 2020-12-06 RX ORDER — HYDRALAZINE HCL 50 MG
25 TABLET ORAL
Refills: 0 | Status: DISCONTINUED | OUTPATIENT
Start: 2020-12-06 | End: 2020-12-06

## 2020-12-06 RX ORDER — HYDRALAZINE HCL 50 MG
1 TABLET ORAL
Qty: 60 | Refills: 0
Start: 2020-12-06 | End: 2021-01-04

## 2020-12-06 RX ADMIN — LOSARTAN POTASSIUM 50 MILLIGRAM(S): 100 TABLET, FILM COATED ORAL at 09:53

## 2020-12-06 RX ADMIN — CELECOXIB 200 MILLIGRAM(S): 200 CAPSULE ORAL at 12:00

## 2020-12-06 RX ADMIN — AMLODIPINE BESYLATE 5 MILLIGRAM(S): 2.5 TABLET ORAL at 05:20

## 2020-12-06 RX ADMIN — Medication 2: at 10:38

## 2020-12-06 RX ADMIN — CITALOPRAM 20 MILLIGRAM(S): 10 TABLET, FILM COATED ORAL at 11:15

## 2020-12-06 RX ADMIN — CELECOXIB 200 MILLIGRAM(S): 200 CAPSULE ORAL at 11:15

## 2020-12-06 RX ADMIN — Medication 25 MILLIGRAM(S): at 09:55

## 2020-12-06 RX ADMIN — HEPARIN SODIUM 5000 UNIT(S): 5000 INJECTION INTRAVENOUS; SUBCUTANEOUS at 05:20

## 2020-12-06 RX ADMIN — Medication 1: at 07:58

## 2020-12-06 RX ADMIN — GABAPENTIN 300 MILLIGRAM(S): 400 CAPSULE ORAL at 11:16

## 2020-12-06 NOTE — DISCHARGE NOTE NURSING/CASE MANAGEMENT/SOCIAL WORK - PATIENT PORTAL LINK FT
You can access the FollowMyHealth Patient Portal offered by [facility] by registering at the following website: [email or web address]. By joining [facility]’s FollowMyHealth portal, you will also be able to view your health information using other applications (apps) compatible with our system.

## 2020-12-06 NOTE — DISCHARGE NOTE PROVIDER - NSDCCPCAREPLAN_GEN_ALL_CORE_FT
PRINCIPAL DISCHARGE DIAGNOSIS  Diagnosis: Dizziness  Assessment and Plan of Treatment:       SECONDARY DISCHARGE DIAGNOSES  Diagnosis: Mild dementia  Assessment and Plan of Treatment:     Diagnosis: HTN (hypertension)  Assessment and Plan of Treatment:     Diagnosis: Metabolic encephalopathy  Assessment and Plan of Treatment:

## 2020-12-06 NOTE — DISCHARGE NOTE PROVIDER - HOSPITAL COURSE
89yo M w/ PMH HTN, DM, BPH, arthritis BIBA d/t AMS. Pt coming from home, family noted pt w/ worsening confusion, per report, pt w/ increased urination and complaining of dysuria. Family unable to provide last known normal, but thinks pt more confused x 3-4 days, worse today. Pt tachycardic on ED arrival. Pt states he is feeling well, endorses dizziness - like he was going to fall down / dehydrated      A/P  Metabolic encephalopathy : resolving..   Dementia as per neurology.  Dizziness - resolved  - MRI head: no acute findings.  - EEG: no Sz as per neurology.  - B12 1459, TSH 0.98, A1C 6.2: result noted   - lactic acid: normal  - blood and urine cult: NGTD.  - continue with supportive care.  - Neurology is following.    Poss dehydration.  - IVF hydration given       BPH (benign prostatic hyperplasia).    -  continue home meds.      Essential hypertension.    - continue home meds.      Type 2 diabetes mellitus without complication, without long-term current use of insulin.   - A1C: 6.2  - on ISS.

## 2020-12-06 NOTE — DISCHARGE NOTE PROVIDER - NSDCMRMEDTOKEN_GEN_ALL_CORE_FT
amLODIPine 5 mg oral tablet: 1 tab(s) orally once a day  CeleBREX 200 mg oral capsule: 1 cap(s) orally once a day, As Needed  citalopram 20 mg oral tablet: 1 tab(s) orally once a day  gabapentin 300 mg oral capsule: 1 cap(s) orally once a day, As Needed  hydrALAZINE 25 mg oral tablet: 1 tab(s) orally 2 times a day  losartan 50 mg oral tablet: 1 tab(s) orally once a day  metFORMIN 1000 mg oral tablet: 1 tab(s) orally 2 times a day  tamsulosin 0.4 mg oral capsule: 1 cap(s) orally once a day

## 2020-12-08 LAB
CULTURE RESULTS: SIGNIFICANT CHANGE UP
CULTURE RESULTS: SIGNIFICANT CHANGE UP
SPECIMEN SOURCE: SIGNIFICANT CHANGE UP
SPECIMEN SOURCE: SIGNIFICANT CHANGE UP

## 2020-12-09 DIAGNOSIS — G93.41 METABOLIC ENCEPHALOPATHY: ICD-10-CM

## 2020-12-09 DIAGNOSIS — E11.9 TYPE 2 DIABETES MELLITUS WITHOUT COMPLICATIONS: ICD-10-CM

## 2020-12-09 DIAGNOSIS — E86.0 DEHYDRATION: ICD-10-CM

## 2020-12-09 DIAGNOSIS — I10 ESSENTIAL (PRIMARY) HYPERTENSION: ICD-10-CM

## 2020-12-09 DIAGNOSIS — M19.90 UNSPECIFIED OSTEOARTHRITIS, UNSPECIFIED SITE: ICD-10-CM

## 2020-12-09 DIAGNOSIS — R41.82 ALTERED MENTAL STATUS, UNSPECIFIED: ICD-10-CM

## 2020-12-09 DIAGNOSIS — N40.0 BENIGN PROSTATIC HYPERPLASIA WITHOUT LOWER URINARY TRACT SYMPTOMS: ICD-10-CM

## 2020-12-09 DIAGNOSIS — Z79.84 LONG TERM (CURRENT) USE OF ORAL HYPOGLYCEMIC DRUGS: ICD-10-CM

## 2020-12-09 DIAGNOSIS — F03.90 UNSPECIFIED DEMENTIA, UNSPECIFIED SEVERITY, WITHOUT BEHAVIORAL DISTURBANCE, PSYCHOTIC DISTURBANCE, MOOD DISTURBANCE, AND ANXIETY: ICD-10-CM

## 2020-12-10 DIAGNOSIS — E11.9 TYPE 2 DIABETES MELLITUS WITHOUT COMPLICATIONS: ICD-10-CM

## 2021-01-02 ENCOUNTER — EMERGENCY (EMERGENCY)
Facility: HOSPITAL | Age: 86
LOS: 0 days | Discharge: HOME | End: 2021-01-02
Attending: STUDENT IN AN ORGANIZED HEALTH CARE EDUCATION/TRAINING PROGRAM
Payer: MEDICARE

## 2021-01-02 VITALS
HEIGHT: 66 IN | TEMPERATURE: 98 F | WEIGHT: 139.99 LBS | OXYGEN SATURATION: 98 % | HEART RATE: 88 BPM | SYSTOLIC BLOOD PRESSURE: 194 MMHG | DIASTOLIC BLOOD PRESSURE: 117 MMHG | RESPIRATION RATE: 16 BRPM

## 2021-01-02 VITALS — SYSTOLIC BLOOD PRESSURE: 173 MMHG | DIASTOLIC BLOOD PRESSURE: 100 MMHG

## 2021-01-02 DIAGNOSIS — I48.91 UNSPECIFIED ATRIAL FIBRILLATION: ICD-10-CM
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
## 2021-01-02 DIAGNOSIS — Z79.84 LONG TERM (CURRENT) USE OF ORAL HYPOGLYCEMIC DRUGS: ICD-10-CM
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
## 2021-01-02 DIAGNOSIS — N40.0 BENIGN PROSTATIC HYPERPLASIA WITHOUT LOWER URINARY TRACT SYMPTOMS: ICD-10-CM

## 2021-01-02 DIAGNOSIS — F03.90 UNSPECIFIED DEMENTIA WITHOUT BEHAVIORAL DISTURBANCE: ICD-10-CM

## 2021-01-02 DIAGNOSIS — M79.10 MYALGIA, UNSPECIFIED SITE: ICD-10-CM

## 2021-01-02 DIAGNOSIS — Z91.81 HISTORY OF FALLING: ICD-10-CM

## 2021-01-02 DIAGNOSIS — G89.29 OTHER CHRONIC PAIN: ICD-10-CM

## 2021-01-02 DIAGNOSIS — I10 ESSENTIAL (PRIMARY) HYPERTENSION: ICD-10-CM

## 2021-01-02 DIAGNOSIS — M25.512 PAIN IN LEFT SHOULDER: ICD-10-CM

## 2021-01-02 DIAGNOSIS — Z79.01 LONG TERM (CURRENT) USE OF ANTICOAGULANTS: ICD-10-CM

## 2021-01-02 DIAGNOSIS — R42 DIZZINESS AND GIDDINESS: ICD-10-CM

## 2021-01-02 PROBLEM — E11.9 TYPE 2 DIABETES MELLITUS WITHOUT COMPLICATIONS: Chronic | Status: ACTIVE | Noted: 2020-12-03

## 2021-01-02 LAB
ALBUMIN SERPL ELPH-MCNC: 3.3 G/DL — SIGNIFICANT CHANGE UP (ref 3.3–5)
ALP SERPL-CCNC: 52 U/L — SIGNIFICANT CHANGE UP (ref 40–120)
ALT FLD-CCNC: 17 U/L — SIGNIFICANT CHANGE UP (ref 12–78)
ANION GAP SERPL CALC-SCNC: 6 MMOL/L — SIGNIFICANT CHANGE UP (ref 5–17)
APTT BLD: 32.5 SEC — SIGNIFICANT CHANGE UP (ref 27.5–35.5)
AST SERPL-CCNC: 11 U/L — LOW (ref 15–37)
BASOPHILS # BLD AUTO: 0.04 K/UL — SIGNIFICANT CHANGE UP (ref 0–0.2)
BASOPHILS NFR BLD AUTO: 0.5 % — SIGNIFICANT CHANGE UP (ref 0–2)
BILIRUB SERPL-MCNC: 1.3 MG/DL — HIGH (ref 0.2–1.2)
BUN SERPL-MCNC: 14 MG/DL — SIGNIFICANT CHANGE UP (ref 7–23)
CALCIUM SERPL-MCNC: 8.7 MG/DL — SIGNIFICANT CHANGE UP (ref 8.5–10.1)
CHLORIDE SERPL-SCNC: 104 MMOL/L — SIGNIFICANT CHANGE UP (ref 96–108)
CO2 SERPL-SCNC: 27 MMOL/L — SIGNIFICANT CHANGE UP (ref 22–31)
CREAT SERPL-MCNC: 0.76 MG/DL — SIGNIFICANT CHANGE UP (ref 0.5–1.3)
EOSINOPHIL # BLD AUTO: 0.05 K/UL — SIGNIFICANT CHANGE UP (ref 0–0.5)
EOSINOPHIL NFR BLD AUTO: 0.6 % — SIGNIFICANT CHANGE UP (ref 0–6)
GLUCOSE SERPL-MCNC: 154 MG/DL — HIGH (ref 70–99)
HCT VFR BLD CALC: 36.2 % — LOW (ref 39–50)
HGB BLD-MCNC: 12 G/DL — LOW (ref 13–17)
IMM GRANULOCYTES NFR BLD AUTO: 0.4 % — SIGNIFICANT CHANGE UP (ref 0–1.5)
INR BLD: 1.26 RATIO — HIGH (ref 0.88–1.16)
LYMPHOCYTES # BLD AUTO: 0.9 K/UL — LOW (ref 1–3.3)
LYMPHOCYTES # BLD AUTO: 11.4 % — LOW (ref 13–44)
MCHC RBC-ENTMCNC: 29.6 PG — SIGNIFICANT CHANGE UP (ref 27–34)
MCHC RBC-ENTMCNC: 33.1 GM/DL — SIGNIFICANT CHANGE UP (ref 32–36)
MCV RBC AUTO: 89.4 FL — SIGNIFICANT CHANGE UP (ref 80–100)
MONOCYTES # BLD AUTO: 0.63 K/UL — SIGNIFICANT CHANGE UP (ref 0–0.9)
MONOCYTES NFR BLD AUTO: 8 % — SIGNIFICANT CHANGE UP (ref 2–14)
NEUTROPHILS # BLD AUTO: 6.25 K/UL — SIGNIFICANT CHANGE UP (ref 1.8–7.4)
NEUTROPHILS NFR BLD AUTO: 79.1 % — HIGH (ref 43–77)
NRBC # BLD: 0 /100 WBCS — SIGNIFICANT CHANGE UP (ref 0–0)
PLATELET # BLD AUTO: 243 K/UL — SIGNIFICANT CHANGE UP (ref 150–400)
POTASSIUM SERPL-MCNC: 3.8 MMOL/L — SIGNIFICANT CHANGE UP (ref 3.5–5.3)
POTASSIUM SERPL-SCNC: 3.8 MMOL/L — SIGNIFICANT CHANGE UP (ref 3.5–5.3)
PROT SERPL-MCNC: 7.2 GM/DL — SIGNIFICANT CHANGE UP (ref 6–8.3)
PROTHROM AB SERPL-ACNC: 14.5 SEC — HIGH (ref 10.6–13.6)
RBC # BLD: 4.05 M/UL — LOW (ref 4.2–5.8)
RBC # FLD: 13.9 % — SIGNIFICANT CHANGE UP (ref 10.3–14.5)
SODIUM SERPL-SCNC: 137 MMOL/L — SIGNIFICANT CHANGE UP (ref 135–145)
WBC # BLD: 7.9 K/UL — SIGNIFICANT CHANGE UP (ref 3.8–10.5)
WBC # FLD AUTO: 7.9 K/UL — SIGNIFICANT CHANGE UP (ref 3.8–10.5)

## 2021-01-02 PROCEDURE — 72170 X-RAY EXAM OF PELVIS: CPT | Mod: 26

## 2021-01-02 PROCEDURE — 72125 CT NECK SPINE W/O DYE: CPT | Mod: 26,MC

## 2021-01-02 PROCEDURE — 93010 ELECTROCARDIOGRAM REPORT: CPT

## 2021-01-02 PROCEDURE — 71045 X-RAY EXAM CHEST 1 VIEW: CPT | Mod: 26

## 2021-01-02 PROCEDURE — 70450 CT HEAD/BRAIN W/O DYE: CPT | Mod: 26,MC

## 2021-01-02 PROCEDURE — 99284 EMERGENCY DEPT VISIT MOD MDM: CPT

## 2021-01-02 NOTE — ED PROVIDER NOTE - PROGRESS NOTE DETAILS
Spoke w/ pt PCP. Pt on Eliquis for Afib. Discussed pt presentation and planned w/u. Pt can f/u w/ PCP outpt neg w/u and d/c. Pt resting comfortably, states he feels better now. Ready for d/c. Spoke w/ pt PCP, Dr Steiner. Pt on Eliquis for Afib. Discussed pt presentation and planned w/u. Pt can f/u w/ PCP outpt if neg w/u and he is d/c'd. Pt resting comfortably, states he feels better now. Ready for d/c. Spoke w/ wife Juju (), will send their son to ED to pick pt up.

## 2021-01-02 NOTE — ED PROVIDER NOTE - OBJECTIVE STATEMENT
89yo M w/ PMH HTN, dementia, BPH, on Eliquis, BIBA d/t slip out of recliner chair this AM. Per wife, pt slid out of chair. Unsure head strike. Pt is poor historian and wife is also in her 90s. Pt does not recall falling this AM, and is unsure if he hit his head. When asked why he is in the ED, pt states: 'It started 3wks ago, I was seen in other hospital, but every thing was normal.' When asked if he remembers falling this AM, pt mentions fall 2wks ago on Monday. When asked if anything hurts him, pt mentions chronic L shoulder pain, but w/o pain currently. Pt also mentions feeling lightheaded. Denies fever, CP, SOB, cough, abd pain, back pain, hip pain, extremity pain. 89yo M w/ PMH HTN, dementia, BPH, Afib on Eliquis, BIBA d/t slip out of recliner chair this AM. Per wife, pt slid out of chair. Unsure head strike. Pt is poor historian and wife is also in her 90s. Pt does not recall falling this AM, and is unsure if he hit his head. When asked why he is in the ED, pt states: 'It started 3wks ago, I was seen in other hospital, but every thing was normal.' When asked if he remembers falling this AM, pt mentions fall 2wks ago on Monday. When asked if anything hurts him, pt mentions chronic L shoulder pain, but w/o pain currently. Pt also mentions feeling lightheaded. Denies fever, CP, SOB, cough, abd pain, back pain, hip pain, extremity pain.

## 2021-01-02 NOTE — ED PROVIDER NOTE - PHYSICAL EXAMINATION
GEN: Awake, alert, interactive, NAD.  HEAD AND NECK: NC/AT. Airway patent. Neck supple.   EYES: Clear b/l. EOMI. PERRL.   ENT: Moist mucus membranes.   CARDIAC: Regular rate, regular rhythm. No evident pedal edema.    RESP/CHEST: Normal respiratory effort with no use of accessory muscles or retractions. Clear throughout on auscultation.  ABD: Soft, non-distended, non-tender. No rebound, no guarding.   BACK: No midline spinal TTP. No CVAT.   EXTREMITIES: Moving all extremities with no apparent deformities.   SKIN: Warm, dry, intact normal color. No rash.   NEURO: AOx3, CN II-XII grossly intact, no focal deficits.   PSYCH: Appropriate mood and affect. GEN: Awake, alert, interactive, NAD.  HEAD AND NECK: NC/AT. Airway patent. Neck supple. No midline C-spine TTP. No scalp deformity / hematoma.   EYES: Clear b/l. EOMI. PERRL.   ENT: Moist mucus membranes.   CARDIAC: Regular rate, regular rhythm. No evident pedal edema.    RESP/CHEST: Normal respiratory effort with no use of accessory muscles or retractions. Clear throughout on auscultation.  ABD: Soft, non-distended, non-tender. No rebound, no guarding.   BACK: No midline spinal TTP. No CVAT.   EXTREMITIES: Moving all extremities with no apparent deformities. No TTP ribcage / hips / clavicles / shoulders / elbows / wrists / knees / ankles.   SKIN: Warm, dry, intact normal color. No rash.   NEURO: AOx2, CN II-XII grossly intact, no focal deficits.   PSYCH: Appropriate mood and affect.

## 2021-01-02 NOTE — ED ADULT NURSE REASSESSMENT NOTE - NS ED NURSE REASSESS COMMENT FT1
0945= eval by Md Greenberg and medically cleared pt for d/c   spoke to wife Juju = [phone] informed of pt going home states will call son to pick pt up for d/c

## 2021-01-02 NOTE — ED PROVIDER NOTE - PATIENT PORTAL LINK FT
You can access the FollowMyHealth Patient Portal offered by [facility] by registering at the following website: [email or web address]. By joining [facility]’s FollowMyHealth portal, you will also be able to view your health information using other applications (apps) compatible with our system.

## 2021-01-02 NOTE — ED ADULT TRIAGE NOTE - CHIEF COMPLAINT QUOTE
brought by ems from home for s/p fall . wife states he slipped from the chair . pt is on Eliquis . h/o dementia , HTN, arthritis and prostate disorder

## 2021-01-02 NOTE — ED PROVIDER NOTE - CLINICAL SUMMARY MEDICAL DECISION MAKING FREE TEXT BOX
89yo M w/ PMH HTN, dementia BIBA d/t slide of out recliner chair this AM, unknown head strike, on AC. AFVSS other than elevated BP. Pt well appearing, in NAD, unremarkable physical exam, no evidence of trauma. Plan: Obtain CT brain, C-spine, CXR, XR pelvis. Obtain CBC, CMP, PT/PTT/INR. Re-eval. CT and XR imaging w/o evidence of acute injury. Labs w/o significant abnormalities. D/w pt PCP. On re-eval, pt resting comfortably. Pt stable for d/c home. Recommend close outpt f/u w/ PCP. Return signs and symptoms discussed. 89yo M w/ PMH HTN, dementia BIBA d/t slide of out recliner chair this AM, unknown head strike, on AC. AFVSS other than elevated BP. Pt well appearing, in NAD, unremarkable physical exam, no evidence of trauma. Plan: Obtain CT brain, C-spine, CXR, XR pelvis. Obtain CBC, CMP, PT/PTT/INR. Re-eval. CT and XR imaging w/o evidence of acute injury. Labs w/o significant abnormalities. D/w pt PCP, Dr Steiner. On re-eval, pt resting comfortably. Pt stable for d/c home. Recommend close outpt f/u w/ PCP. Return signs and symptoms discussed w/ pt and wife (over the phone) They understand and agree w/ this plan.

## 2021-01-02 NOTE — ED ADULT NURSE NOTE - OBJECTIVE STATEMENT
biba s/p slipped / fell at home per wife . no LOC reported pt on Eliquis . no visible signs of injury noted

## 2022-07-06 NOTE — PHYSICAL THERAPY INITIAL EVALUATION ADULT - NAME OF CLINICIAN
INDER Flor This was a shared visit with the ANGELIA. I reviewed and verified the documentation and independently performed the documented:
